# Patient Record
Sex: MALE | Race: BLACK OR AFRICAN AMERICAN | URBAN - METROPOLITAN AREA
[De-identification: names, ages, dates, MRNs, and addresses within clinical notes are randomized per-mention and may not be internally consistent; named-entity substitution may affect disease eponyms.]

---

## 2020-10-19 ENCOUNTER — OFFICE VISIT (OUTPATIENT)
Dept: FAMILY MEDICINE CLINIC | Age: 40
End: 2020-10-19
Payer: COMMERCIAL

## 2020-10-19 VITALS
BODY MASS INDEX: 33.77 KG/M2 | WEIGHT: 228 LBS | HEART RATE: 78 BPM | DIASTOLIC BLOOD PRESSURE: 75 MMHG | OXYGEN SATURATION: 98 % | HEIGHT: 69 IN | RESPIRATION RATE: 18 BRPM | SYSTOLIC BLOOD PRESSURE: 121 MMHG | TEMPERATURE: 98.3 F

## 2020-10-19 DIAGNOSIS — N44.2 BENIGN CYST OF BOTH TESTICLES: ICD-10-CM

## 2020-10-19 DIAGNOSIS — N50.812 PAIN IN LEFT TESTICLE: Primary | ICD-10-CM

## 2020-10-19 PROCEDURE — 99202 OFFICE O/P NEW SF 15 MIN: CPT | Performed by: NURSE PRACTITIONER

## 2020-10-19 NOTE — PROGRESS NOTES
Chief Complaint   Patient presents with   [address]     Patient in office today for est care. Pt previous pcp was Gabriel Pagan. Pt have concern regarding f/u care. Pt states previous pcp dx pt with cyst in right testicle. Pt was prescribed cipro, but never took due to poss SE.  Pt states pain was dull, have been treating with ibuprofen and tylenol-with relief noted. Pt states pain is sporadic. Pt states he notes pain is more noted when sitting down or resting. Pt have provided report from pcp report and [facility] pain. 1. Have you been to the ER, urgent care clinic since your last visit? Hospitalized since your last visit? No    2. Have you seen or consulted any other health care providers outside of the [address] [place] [address] since your last visit? Include any pap smears or colon screening.  No

## 2020-10-19 NOTE — PROGRESS NOTES
Chief Complaint   Patient presents with   [address]     Patient in office today for est care. Pt previous pcp was Sana Hudson. Pt have concern regarding f/u care. States that he felt a lump on the scrotal sac. Saw PCP who ordered imaging. Missed imaging appt initially due to being in pain. Pain was more localized to the testicle. Cyst present on the scrotal sac had resolved by the time he had the US done. Pt states previous pcp dx pt with cyst in right testicle. US was done on 9/3. Pt was prescribed cipro, but never took due to poss SE.  Pt states pain was dull, have been treating with ibuprofen and tylenol-with relief noted. Pt states pain is sporadic. Pt states he notes pain is more noted when sitting down or resting. Pt have provided report from pcp report and [facility] pain. Pain will come and go. Denies any pain over the last few days. Sometimes notices a dull pain. More problematic when he sits for long periods. Will take 600 mg of tylenol which helps. Pt was told that the [address] [facility] [facility] should be repeated in 6 months. Chief Complaint   Patient presents with   [address]     he is a 42y.o. year old male who presents for evalution. Reviewed PmHx, RxHx, FmHx, SocHx, AllgHx and updated and dated in the chart.     Review of Systems - negative except as listed above in the HPI    Objective:     Vitals:    10/19/20 1411   BP: 121/75   Pulse: 78   Resp: 18   Temp: 98.3 °F (36.8 °C)   TempSrc: Oral   SpO2: 98%   Weight: 228 lb (103.4 kg)   Height: 5' 8.7\" (1.745 m)     Physical Examination: General appearance - alert, well appearing, and in no distress  Mental status - normal mood, behavior, speech, dress, motor activity, and thought processes  Chest - clear to auscultation, no wheezes, rales or rhonchi, symmetric air entry  Heart - normal rate, regular rhythm, normal S1, S2, no murmurs   Male - TESTICULAR EXAM: very tiny pinhead sized lesion present on surface of left testicle that patient is able to find on self exam, PENIS: normal without lesions or discharge    Assessment/ Plan:   Diagnoses and all orders for this visit:    1. Pain in left testicle / 2. Benign cyst of both testicles  -     REFERRAL TO UROLOGY  Reassured pt that findings on US appear benign. Unclear why cipro was prescribed, therefore advised pt to continue with plan to not take medication. Referral to urology provided as pt and his wife are concerned about findings on US. Follow-up and Dispositions    · Return if symptoms worsen or fail to improve. I have discussed the diagnosis with the patient and the intended plan as seen in the above orders. The patient has received an after-visit summary and questions were answered concerning future plans. Medication Side Effects and Warnings were discussed with patient: yes  Patient Labs were reviewed and or requested: no  Patient Past Records were reviewed and or requested  yes  Patient / Caregiver Understanding of treatment plan was verbalized during office visit YES    STACEY Pete    There are no Patient Instructions on file for this visit.

## 2023-01-17 ENCOUNTER — HOSPITAL ENCOUNTER (EMERGENCY)
Age: 43
Discharge: HOME | End: 2023-01-17
Attending: STUDENT IN AN ORGANIZED HEALTH CARE EDUCATION/TRAINING PROGRAM
Payer: COMMERCIAL

## 2023-01-17 ENCOUNTER — APPOINTMENT (OUTPATIENT)
Dept: GENERAL RADIOLOGY | Age: 43
End: 2023-01-17
Attending: STUDENT IN AN ORGANIZED HEALTH CARE EDUCATION/TRAINING PROGRAM
Payer: COMMERCIAL

## 2023-01-17 VITALS
SYSTOLIC BLOOD PRESSURE: 138 MMHG | RESPIRATION RATE: 18 BRPM | BODY MASS INDEX: 34.1 KG/M2 | WEIGHT: 225 LBS | OXYGEN SATURATION: 98 % | TEMPERATURE: 98.1 F | HEART RATE: 61 BPM | DIASTOLIC BLOOD PRESSURE: 71 MMHG | HEIGHT: 68 IN

## 2023-01-17 DIAGNOSIS — R07.89 MUSCULOSKELETAL CHEST PAIN: Primary | ICD-10-CM

## 2023-01-17 LAB
ALBUMIN SERPL-MCNC: 4.4 G/DL (ref 3.5–5.2)
ALBUMIN/GLOB SERPL: 1.6 (ref 1.1–2.2)
ALP SERPL-CCNC: 79 U/L (ref 40–129)
ALT SERPL-CCNC: 17 U/L (ref 10–50)
ANION GAP SERPL CALC-SCNC: 14 MMOL/L (ref 5–15)
AST SERPL-CCNC: 25 U/L (ref 10–50)
BASOPHILS # BLD: 0.1 K/UL (ref 0–1)
BASOPHILS NFR BLD: 1 % (ref 0–1)
BILIRUB SERPL-MCNC: 0.2 MG/DL (ref 0.2–1)
BUN SERPL-MCNC: 16 MG/DL (ref 6–20)
BUN/CREAT SERPL: 15 (ref 12–20)
CALCIUM SERPL-MCNC: 9.3 MG/DL (ref 8.6–10)
CHLORIDE SERPL-SCNC: 104 MMOL/L (ref 98–107)
CO2 SERPL-SCNC: 25 MMOL/L (ref 22–29)
CREAT SERPL-MCNC: 1.07 MG/DL (ref 0.7–1.2)
DIFFERENTIAL METHOD BLD: ABNORMAL
EOSINOPHIL # BLD: 0.3 K/UL (ref 0–0.4)
EOSINOPHIL NFR BLD: 3 % (ref 0–7)
ERYTHROCYTE [DISTWIDTH] IN BLOOD BY AUTOMATED COUNT: 13.7 % (ref 11.5–14.5)
GLOBULIN SER CALC-MCNC: 2.8 G/DL (ref 2–4)
GLUCOSE SERPL-MCNC: 109 MG/DL (ref 65–100)
HCT VFR BLD AUTO: 41.9 % (ref 36.6–50.3)
HGB BLD-MCNC: 13.6 G/DL (ref 12.1–17)
IMM GRANULOCYTES # BLD AUTO: 0 K/UL (ref 0–0.04)
IMM GRANULOCYTES NFR BLD AUTO: 0 % (ref 0–0.5)
LYMPHOCYTES # BLD: 3.7 K/UL (ref 0.8–3.5)
LYMPHOCYTES NFR BLD: 37 % (ref 12–49)
MAGNESIUM SERPL-MCNC: 2.2 MG/DL (ref 1.6–2.6)
MCH RBC QN AUTO: 27.6 PG (ref 26–34)
MCHC RBC AUTO-ENTMCNC: 32.5 G/DL (ref 30–36.5)
MCV RBC AUTO: 85.2 FL (ref 80–99)
MONOCYTES # BLD: 1 K/UL (ref 0–1)
MONOCYTES NFR BLD: 10 % (ref 5–13)
NEUTS SEG # BLD: 5.1 K/UL (ref 1.8–8)
NEUTS SEG NFR BLD: 49 % (ref 32–75)
NRBC # BLD: 0 K/UL (ref 0–0.01)
NRBC BLD-RTO: 0 PER 100 WBC
PLATELET # BLD AUTO: 191 K/UL (ref 150–400)
PMV BLD AUTO: 11.9 FL (ref 8.9–12.9)
POTASSIUM SERPL-SCNC: 3.8 MMOL/L (ref 3.5–5.1)
PROT SERPL-MCNC: 7.2 G/DL (ref 6.4–8.3)
RBC # BLD AUTO: 4.92 M/UL (ref 4.1–5.7)
SODIUM SERPL-SCNC: 143 MMOL/L (ref 136–145)
TROPONIN I BLD-MCNC: <0.04 NG/ML (ref 0–0.08)
WBC # BLD AUTO: 10.2 K/UL (ref 4.1–11.1)

## 2023-01-17 PROCEDURE — 74011250637 HC RX REV CODE- 250/637: Performed by: STUDENT IN AN ORGANIZED HEALTH CARE EDUCATION/TRAINING PROGRAM

## 2023-01-17 PROCEDURE — 85025 COMPLETE CBC W/AUTO DIFF WBC: CPT

## 2023-01-17 PROCEDURE — 99285 EMERGENCY DEPT VISIT HI MDM: CPT

## 2023-01-17 PROCEDURE — 80053 COMPREHEN METABOLIC PANEL: CPT

## 2023-01-17 PROCEDURE — 36415 COLL VENOUS BLD VENIPUNCTURE: CPT

## 2023-01-17 PROCEDURE — 71046 X-RAY EXAM CHEST 2 VIEWS: CPT

## 2023-01-17 PROCEDURE — 83735 ASSAY OF MAGNESIUM: CPT

## 2023-01-17 PROCEDURE — 93005 ELECTROCARDIOGRAM TRACING: CPT

## 2023-01-17 RX ORDER — GUAIFENESIN 100 MG/5ML
324 LIQUID (ML) ORAL
Status: COMPLETED | OUTPATIENT
Start: 2023-01-17 | End: 2023-01-17

## 2023-01-17 RX ADMIN — ASPIRIN 324 MG: 81 TABLET, CHEWABLE ORAL at 19:41

## 2023-01-18 NOTE — ED PROVIDER NOTES
Patient is an otherwise healthy 82-year-old male presenting to the ED with intermittent chest pain for about 2 weeks. Patient was seen at urgent care and they stated patient had a abnormal EKG. No signs of STEMI on EKG, suspect a lead placement issue. Patient low risk for ACS per heart pathway. However based on patient's pain and continued symptoms further ED work-up indicated. The history is provided by the patient and medical records. Chest Pain (Angina)        No past medical history on file. No past surgical history on file. Family History:   Problem Relation Age of Onset    Hypertension Father     Diabetes Other     Hypertension Other        Social History     Socioeconomic History    Marital status:      Spouse name: Not on file    Number of children: Not on file    Years of education: Not on file    Highest education level: Not on file   Occupational History    Not on file   Tobacco Use    Smoking status: Never    Smokeless tobacco: Never   Substance and Sexual Activity    Alcohol use: No    Drug use: Never    Sexual activity: Yes   Other Topics Concern    Not on file   Social History Narrative    Not on file     Social Determinants of Health     Financial Resource Strain: Not on file   Food Insecurity: Not on file   Transportation Needs: Not on file   Physical Activity: Not on file   Stress: Not on file   Social Connections: Not on file   Intimate Partner Violence: Not on file   Housing Stability: Not on file         ALLERGIES: Penicillins    Review of Systems   Constitutional:  Negative for activity change, appetite change and fatigue. Cardiovascular:  Positive for chest pain. Vitals:    01/17/23 1917   BP: (!) 140/81   Pulse: 67   Resp: 12   Temp: 98.1 °F (36.7 °C)   SpO2: 100%   Weight: 102.1 kg (225 lb)   Height: 5' 8\" (1.727 m)            Physical Exam  Vitals and nursing note reviewed. Constitutional:       General: He is not in acute distress.      Appearance: Normal appearance. HENT:      Head: Normocephalic and atraumatic. Right Ear: External ear normal.      Left Ear: External ear normal.      Nose: Nose normal.   Eyes:      Conjunctiva/sclera: Conjunctivae normal.   Cardiovascular:      Rate and Rhythm: Normal rate and regular rhythm. Pulses: Normal pulses. Radial pulses are 2+ on the right side and 2+ on the left side. Heart sounds: Normal heart sounds. Pulmonary:      Effort: Pulmonary effort is normal.      Breath sounds: Normal breath sounds. Chest:      Chest wall: Tenderness present. Abdominal:      General: There is no distension. Palpations: Abdomen is soft. Tenderness: There is no abdominal tenderness. Musculoskeletal:         General: No deformity or signs of injury. Normal range of motion. Skin:     General: Skin is warm and dry. Capillary Refill: Capillary refill takes less than 2 seconds. Neurological:      General: No focal deficit present. Mental Status: He is alert and oriented to person, place, and time. Psychiatric:         Attention and Perception: Attention normal.         Mood and Affect: Mood normal.         Behavior: Behavior normal.        Medical Decision Making  Amount and/or Complexity of Data Reviewed  Independent Historian: spouse  External Data Reviewed: ECG. Labs: ordered. Decision-making details documented in ED Course. Radiology: ordered and independent interpretation performed. ECG/medicine tests: ordered and independent interpretation performed. Risk  OTC drugs. ED Course as of 01/19/23 1407   Tue Jan 17, 2023   0715 EKG interpretation:   Rhythm: normal sinus rhythm; and regular . Rate (approx.): 77; Axis: normal; Intervals: normal ; ST/T wave: non-specific changes; EKG documented and interpreted by Brendon Jeffries. Lenora Bauer MD, Emergency Medicine.   [AL]   1906 EKG interpretation: EKG from patient first  Rhythm: sinus bradycardia; and regular .  Rate (approx.): 57; Axis: normal; Intervals: normal ; ST/T wave: non-specific changes, possible elevation in V2, no reciprocal; EKG documented and interpreted by David Sanders. Deepa Reyna MD, Emergency Medicine.   [AL]   1945 Troponin-I (POC): <0.04 [AL]   1945 WBC: 10.2 [AL]   1945 HGB: 13.6 [AL]      ED Course User Index  [AL] Fredy Menezes MD       LABORATORY RESULTS:  Labs Reviewed   CBC WITH AUTOMATED DIFF - Abnormal; Notable for the following components:       Result Value    ABS. LYMPHOCYTES 3.7 (*)     All other components within normal limits   METABOLIC PANEL, COMPREHENSIVE - Abnormal; Notable for the following components:    Glucose 109 (*)     All other components within normal limits   MAGNESIUM   POC TROPONIN-I       IMAGING RESULTS:  XR CHEST PA LAT   Final Result   No acute cardiopulmonary disease. MEDICATIONS GIVEN:  Medications   aspirin chewable tablet 324 mg (324 mg Oral Given 1/17/23 1941)       Differential diagnosis: ACS, muscle skeletal pain, weightlifting injury, pneumothorax, pneumonia, costochondritis    ED physician interpretation of imaging: Chest x-ray without focal pneumonia, pneumothorax  ED physician interpretation of EKG: No STEMI. See my interpretation EKG in ED course above. ED physician interpretation of laboratory results: Troponin within normal limits, doubt ACS after 2 weeks of chest   pain. No anemia, leukocytosis or electrolyte abnormalities    MDM: You presented to the ED with chest pain for 2 weeks. Your work-up for heart attack/ACS was unremarkable. Chest x-ray, EKG, troponin did not show any signs of heart attack. Your other lab work was unremarkable. If your pain worsens or changes return to the ED for further evaluation. Based on recent workout history and heavy bench pressing and dips I suspect this is a muscle skeletal pain. PCP follow-up appropriate. Further personalized recommendations for outpatient care as below. Key discharge instructions and summary of care:  You presented to the ED with about 2 weeks of intermittent central chest pain. An EKG, chest x-ray troponin and other lab work was obtained here in the ED. You wre on the cardiac monitor for about 2 hours. No signs of arrhythmia, heart attack or other concerning features. I suspect your pain is muscle skeletal in nature. Take Tylenol Motrin for pain. Follow-up closely with your PCP. The patient has been re-evaluated and feeling better. Patient is stable for discharge. All available radiology and laboratory results have been reviewed with patient and/or available family. Patient and/or family verbally conveyed their understanding and agreement of the patient's signs, symptoms, diagnosis, treatment and prognosis and additionally agree to follow-up as recommended in the discharge instructions or to return to the Emergency Department should their condition change or worsen prior to their follow-up appointment. All questions have been answered and patient and/or available family express understanding. IMPRESSION:  1. Musculoskeletal chest pain        DISPOSITION: Discharged     Zander Harvey MD      Procedures

## 2023-01-18 NOTE — DISCHARGE INSTRUCTIONS
You presented to the ED with about 2 weeks of intermittent central chest pain. An EKG, chest x-ray troponin and other lab work was obtained here in the ED. You wre on the cardiac monitor for about 2 hours. No signs of arrhythmia, heart attack or other concerning features. I suspect your pain is muscle skeletal in nature. Take Tylenol Motrin for pain. Follow-up closely with your PCP.

## 2023-01-18 NOTE — ED TRIAGE NOTES
Pt arrives to ED from urgent care for intermittent chest pain x 2 week. Pt states that he is able to press on spot and make pain go away. Pain radiates to back and describes it as feeling like an \"air pocket\". Denies nausea or SOB.

## 2023-01-19 LAB
ATRIAL RATE: 77 BPM
CALCULATED P AXIS, ECG09: 54 DEGREES
CALCULATED R AXIS, ECG10: 4 DEGREES
CALCULATED T AXIS, ECG11: 14 DEGREES
DIAGNOSIS, 93000: NORMAL
P-R INTERVAL, ECG05: 168 MS
Q-T INTERVAL, ECG07: 356 MS
QRS DURATION, ECG06: 88 MS
QTC CALCULATION (BEZET), ECG08: 402 MS
VENTRICULAR RATE, ECG03: 77 BPM

## 2024-07-12 ENCOUNTER — HOSPITAL ENCOUNTER (EMERGENCY)
Facility: HOSPITAL | Age: 44
Discharge: HOME | End: 2024-07-12
Attending: EMERGENCY MEDICINE
Payer: COMMERCIAL

## 2024-07-12 ENCOUNTER — APPOINTMENT (OUTPATIENT)
Facility: HOSPITAL | Age: 44
End: 2024-07-12
Payer: COMMERCIAL

## 2024-07-12 VITALS
BODY MASS INDEX: 33.03 KG/M2 | TEMPERATURE: 98.5 F | SYSTOLIC BLOOD PRESSURE: 134 MMHG | OXYGEN SATURATION: 99 % | DIASTOLIC BLOOD PRESSURE: 83 MMHG | HEIGHT: 69 IN | RESPIRATION RATE: 18 BRPM | HEART RATE: 59 BPM | WEIGHT: 223 LBS

## 2024-07-12 DIAGNOSIS — R10.84 GENERALIZED ABDOMINAL PAIN: Primary | ICD-10-CM

## 2024-07-12 LAB
ALBUMIN SERPL-MCNC: 4.5 G/DL (ref 3.5–5.2)
ALBUMIN/GLOB SERPL: 1 (ref 1.1–2.2)
ALP SERPL-CCNC: 77 U/L (ref 40–129)
ALT SERPL-CCNC: 15 U/L (ref 10–50)
ANION GAP SERPL CALC-SCNC: 10 MMOL/L (ref 5–15)
APPEARANCE UR: CLEAR
AST SERPL-CCNC: 19 U/L (ref 10–50)
BACTERIA URNS QL MICRO: NEGATIVE /HPF
BASOPHILS # BLD: 0.1 K/UL (ref 0–1)
BASOPHILS NFR BLD: 1 % (ref 0–1)
BILIRUB SERPL-MCNC: 0.3 MG/DL (ref 0.2–1)
BILIRUB UR QL: NEGATIVE
BUN SERPL-MCNC: 17 MG/DL (ref 6–20)
BUN/CREAT SERPL: 16 (ref 12–20)
CALCIUM SERPL-MCNC: 9.5 MG/DL (ref 8.6–10)
CHLORIDE SERPL-SCNC: 106 MMOL/L (ref 98–107)
CO2 SERPL-SCNC: 28 MMOL/L (ref 22–29)
COLOR UR: NORMAL
CREAT SERPL-MCNC: 1.07 MG/DL (ref 0.7–1.2)
DIFFERENTIAL METHOD BLD: NORMAL
EOSINOPHIL # BLD: 0.4 K/UL (ref 0–0.4)
EOSINOPHIL NFR BLD: 4 % (ref 0–7)
EPITH CASTS URNS QL MICRO: NORMAL /LPF
ERYTHROCYTE [DISTWIDTH] IN BLOOD BY AUTOMATED COUNT: 13.9 % (ref 11.5–14.5)
GLOBULIN SER CALC-MCNC: 4.5 G/DL (ref 2–4)
GLUCOSE SERPL-MCNC: 91 MG/DL (ref 65–100)
GLUCOSE UR STRIP.AUTO-MCNC: NEGATIVE MG/DL
HCT VFR BLD AUTO: 41.7 % (ref 36.6–50.3)
HGB BLD-MCNC: 13.5 G/DL (ref 12.1–17)
HGB UR QL STRIP: NEGATIVE
IMM GRANULOCYTES # BLD AUTO: 0 K/UL (ref 0–0.04)
IMM GRANULOCYTES NFR BLD AUTO: 0 % (ref 0–0.5)
KETONES UR QL STRIP.AUTO: NEGATIVE MG/DL
LEUKOCYTE ESTERASE UR QL STRIP.AUTO: NEGATIVE
LIPASE SERPL-CCNC: 21 U/L (ref 13–60)
LYMPHOCYTES # BLD: 3.3 K/UL (ref 0.8–3.5)
LYMPHOCYTES NFR BLD: 34 % (ref 12–49)
MCH RBC QN AUTO: 28 PG (ref 26–34)
MCHC RBC AUTO-ENTMCNC: 32.4 G/DL (ref 30–36.5)
MCV RBC AUTO: 86.3 FL (ref 80–99)
MONOCYTES # BLD: 0.8 K/UL (ref 0–1)
MONOCYTES NFR BLD: 9 % (ref 5–13)
NEUTS SEG # BLD: 5 K/UL (ref 1.8–8)
NEUTS SEG NFR BLD: 52 % (ref 32–75)
NITRITE UR QL STRIP.AUTO: NEGATIVE
NRBC # BLD: 0 K/UL (ref 0–0.01)
NRBC BLD-RTO: 0 PER 100 WBC
PH UR STRIP: 6 (ref 5–8)
PLATELET # BLD AUTO: 193 K/UL (ref 150–400)
PMV BLD AUTO: 12.1 FL (ref 8.9–12.9)
POTASSIUM SERPL-SCNC: 4.2 MMOL/L (ref 3.5–5.1)
PROT SERPL-MCNC: 9 G/DL (ref 6.4–8.3)
PROT UR STRIP-MCNC: NEGATIVE MG/DL
RBC # BLD AUTO: 4.83 M/UL (ref 4.1–5.7)
RBC #/AREA URNS HPF: NORMAL /HPF (ref 0–5)
SODIUM SERPL-SCNC: 144 MMOL/L (ref 136–145)
SP GR UR REFRACTOMETRY: 1.02 (ref 1–1.03)
SPECIMEN HOLD: NORMAL
UROBILINOGEN UR QL STRIP.AUTO: 0.2 EU/DL (ref 0.2–1)
WBC # BLD AUTO: 9.7 K/UL (ref 4.1–11.1)
WBC URNS QL MICRO: NORMAL /HPF (ref 0–4)

## 2024-07-12 PROCEDURE — 80053 COMPREHEN METABOLIC PANEL: CPT

## 2024-07-12 PROCEDURE — 36415 COLL VENOUS BLD VENIPUNCTURE: CPT

## 2024-07-12 PROCEDURE — 74177 CT ABD & PELVIS W/CONTRAST: CPT

## 2024-07-12 PROCEDURE — 81001 URINALYSIS AUTO W/SCOPE: CPT

## 2024-07-12 PROCEDURE — 6360000004 HC RX CONTRAST MEDICATION

## 2024-07-12 PROCEDURE — 2580000003 HC RX 258

## 2024-07-12 PROCEDURE — 83690 ASSAY OF LIPASE: CPT

## 2024-07-12 PROCEDURE — 85025 COMPLETE CBC W/AUTO DIFF WBC: CPT

## 2024-07-12 PROCEDURE — 99285 EMERGENCY DEPT VISIT HI MDM: CPT

## 2024-07-12 PROCEDURE — 76870 US EXAM SCROTUM: CPT

## 2024-07-12 RX ORDER — 0.9 % SODIUM CHLORIDE 0.9 %
1000 INTRAVENOUS SOLUTION INTRAVENOUS ONCE
Status: COMPLETED | OUTPATIENT
Start: 2024-07-12 | End: 2024-07-12

## 2024-07-12 RX ADMIN — IOPAMIDOL 100 ML: 755 INJECTION, SOLUTION INTRAVENOUS at 20:40

## 2024-07-12 RX ADMIN — SODIUM CHLORIDE 1000 ML: 9 INJECTION, SOLUTION INTRAVENOUS at 20:09

## 2024-07-12 ASSESSMENT — LIFESTYLE VARIABLES
HOW OFTEN DO YOU HAVE A DRINK CONTAINING ALCOHOL: NEVER
HOW MANY STANDARD DRINKS CONTAINING ALCOHOL DO YOU HAVE ON A TYPICAL DAY: PATIENT DOES NOT DRINK

## 2024-07-12 ASSESSMENT — ENCOUNTER SYMPTOMS
ABDOMINAL PAIN: 1
CONSTIPATION: 1

## 2024-07-12 ASSESSMENT — PAIN DESCRIPTION - DESCRIPTORS: DESCRIPTORS: DULL;ACHING

## 2024-07-12 ASSESSMENT — PAIN SCALES - GENERAL: PAINLEVEL_OUTOF10: 2

## 2024-07-12 ASSESSMENT — PAIN - FUNCTIONAL ASSESSMENT: PAIN_FUNCTIONAL_ASSESSMENT: 0-10

## 2024-07-12 NOTE — ED PROVIDER NOTES
[facility] [place]  EMERGENCY DEPARTMENT ENCOUNTER      Pt Name: Jimmy Thorpe  MRN: [id number]  Birthdate 1980  Date of evaluation: 7/12/2024  Provider: Gabriela Palacio PA-C    CHIEF COMPLAINT       Chief Complaint   Patient presents with    Abdominal Pain    Flank Pain         HISTORY OF PRESENT ILLNESS   (Location/Symptom, Timing/Onset, Context/Setting, Quality, Duration, Modifying Factors, Severity)  Note limiting factors.   Jimmy Thorpe is a 43 y.o. male with no significant past medical history who presents from home to [facility] ED with cc of lower abdominal and left-sided flank pain x 2 months.  Patient reports he initially started with back pain after beginning a new workout routine.  He was seen and prescribed a Medrol Dosepak which she did not end up taking for suspected musculoskeletal back pain.  He reports more recently he is having a change in his bowel movements.  He reports he typically had 2 bowel movements a day which were formed.  Patient reports now he is having very small hard bowel movements and they are less frequent.  He reports generalized abdominal discomfort.  He has no history of any GI issues.  No fevers, nausea, vomiting, diarrhea.  No previous abdominal surgeries.  Patient also notes increasing discomfort in his scrotum.  Denies scrotal swelling or discoloration.  He reports history of of a cyst on one of his testicles.  He reports the pain is similar to when he first was diagnosed with a cyst.  He had not had the pain in many years.  Denies urinary symptoms.      PCP: Tha Jimenez MD    There are no other complaints, changes or physical findings at this time.        The history is provided by the patient.         Review of External Medical Records:     Nursing Notes were reviewed.    REVIEW OF SYSTEMS    (2-9 systems for level 4, 10 or more for level 5)     Review of Systems   Gastrointestinal:  Positive for abdominal pain and constipation.       Except as noted

## 2024-07-12 NOTE — ED TRIAGE NOTES
Pt to ED c/o abd discomfort and flank pain described as dull ache that improves when eating. Pt reports BMs that are minimal like \"rabbit droppings.\" Last BM this morning 0600. Denies no N/V. No urinary issues but some discomfort in scrotum. Symptoms have been persistent intermittently x2 months. Pt states he began a new workout regimen which entails lots of oblique twisting. No medications taken for symptoms at home. NAD at triage. Vitals stable.

## 2024-07-13 NOTE — DISCHARGE INSTRUCTIONS
You were seen today in the emergency department for generalized abdominal pain, scrotal pain and back pain.  Your workup was reassuring.  CT scan of your abdomen shows no acute findings.  Ultrasound of your scrotum and testicles shows a moderate hydrocele and bilateral testicle cysts.  Follow-up with your urologist.  You should also follow-up with gastroenterology to discuss the change in your stool pattern.  You can take a laxative such as MiraLAX to help soften your stools.  Return to the emergency department for any new or worsening symptoms.